# Patient Record
Sex: FEMALE | Race: ASIAN | NOT HISPANIC OR LATINO | Employment: FULL TIME | URBAN - METROPOLITAN AREA
[De-identification: names, ages, dates, MRNs, and addresses within clinical notes are randomized per-mention and may not be internally consistent; named-entity substitution may affect disease eponyms.]

---

## 2017-11-10 ENCOUNTER — OFFICE VISIT - HEALTHEAST (OUTPATIENT)
Dept: FAMILY MEDICINE | Facility: CLINIC | Age: 32
End: 2017-11-10

## 2017-11-10 DIAGNOSIS — E66.9 OBESITY: ICD-10-CM

## 2017-11-10 DIAGNOSIS — N92.6 IRREGULAR PERIODS/MENSTRUAL CYCLES: ICD-10-CM

## 2017-11-10 DIAGNOSIS — Z00.00 ROUTINE GENERAL MEDICAL EXAMINATION AT A HEALTH CARE FACILITY: ICD-10-CM

## 2017-11-10 DIAGNOSIS — L83 ACANTHOSIS NIGRICANS: ICD-10-CM

## 2017-11-10 DIAGNOSIS — E66.01 MORBID OBESITY (H): ICD-10-CM

## 2017-11-10 LAB
CHOLEST SERPL-MCNC: 177 MG/DL
FASTING STATUS PATIENT QL REPORTED: YES
HBA1C MFR BLD: 5.5 % (ref 3.5–6)
HDLC SERPL-MCNC: 50 MG/DL
LDLC SERPL CALC-MCNC: 111 MG/DL
TRIGL SERPL-MCNC: 82 MG/DL

## 2017-11-10 ASSESSMENT — MIFFLIN-ST. JEOR: SCORE: 1758.23

## 2017-11-14 LAB — 17-HYDROXYPROGESTERONE, S: <40 NG/DL

## 2017-11-16 ENCOUNTER — COMMUNICATION - HEALTHEAST (OUTPATIENT)
Dept: FAMILY MEDICINE | Facility: CLINIC | Age: 32
End: 2017-11-16

## 2017-11-16 LAB
BKR LAB AP ABNORMAL BLEEDING: NO
BKR LAB AP BIRTH CONTROL/HORMONES: NORMAL
BKR LAB AP CERVICAL APPEARANCE: NORMAL
BKR LAB AP GYN ADEQUACY: NORMAL
BKR LAB AP GYN INTERPRETATION: NORMAL
BKR LAB AP HPV REFLEX: NORMAL
BKR LAB AP LMP: NORMAL
BKR LAB AP PATIENT STATUS: NORMAL
BKR LAB AP PREVIOUS ABNORMAL: NO
BKR LAB AP PREVIOUS NORMAL: NORMAL
HIGH RISK?: NO
HPV INTERPRETATION - HISTORICAL: NORMAL
HPV INTERPRETER - HISTORICAL: NORMAL
PATH REPORT.COMMENTS IMP SPEC: NORMAL
RESULT FLAG (HE HISTORICAL CONVERSION): NORMAL

## 2020-06-24 ENCOUNTER — OFFICE VISIT - HEALTHEAST (OUTPATIENT)
Dept: FAMILY MEDICINE | Facility: CLINIC | Age: 35
End: 2020-06-24

## 2020-06-24 DIAGNOSIS — M54.6 CHRONIC BILATERAL THORACIC BACK PAIN: ICD-10-CM

## 2020-06-24 DIAGNOSIS — G89.29 CHRONIC BILATERAL THORACIC BACK PAIN: ICD-10-CM

## 2020-06-24 ASSESSMENT — MIFFLIN-ST. JEOR: SCORE: 1716.27

## 2020-07-10 ENCOUNTER — OFFICE VISIT - HEALTHEAST (OUTPATIENT)
Dept: PHYSICAL THERAPY | Facility: REHABILITATION | Age: 35
End: 2020-07-10

## 2020-07-10 DIAGNOSIS — G89.29 CHRONIC BILATERAL THORACIC BACK PAIN: ICD-10-CM

## 2020-07-10 DIAGNOSIS — M54.6 CHRONIC BILATERAL THORACIC BACK PAIN: ICD-10-CM

## 2020-07-24 ENCOUNTER — OFFICE VISIT - HEALTHEAST (OUTPATIENT)
Dept: PHYSICAL THERAPY | Facility: REHABILITATION | Age: 35
End: 2020-07-24

## 2020-07-24 DIAGNOSIS — M54.6 CHRONIC BILATERAL THORACIC BACK PAIN: ICD-10-CM

## 2020-07-24 DIAGNOSIS — G89.29 CHRONIC BILATERAL THORACIC BACK PAIN: ICD-10-CM

## 2021-01-26 ENCOUNTER — AMBULATORY - HEALTHEAST (OUTPATIENT)
Dept: NURSING | Facility: CLINIC | Age: 36
End: 2021-01-26

## 2021-02-16 ENCOUNTER — AMBULATORY - HEALTHEAST (OUTPATIENT)
Dept: NURSING | Facility: CLINIC | Age: 36
End: 2021-02-16

## 2021-03-04 ENCOUNTER — COMMUNICATION - HEALTHEAST (OUTPATIENT)
Dept: FAMILY MEDICINE | Facility: CLINIC | Age: 36
End: 2021-03-04

## 2021-03-05 ENCOUNTER — OFFICE VISIT - HEALTHEAST (OUTPATIENT)
Dept: FAMILY MEDICINE | Facility: CLINIC | Age: 36
End: 2021-03-05

## 2021-03-05 DIAGNOSIS — Z00.00 ROUTINE GENERAL MEDICAL EXAMINATION AT A HEALTH CARE FACILITY: ICD-10-CM

## 2021-03-05 DIAGNOSIS — N92.6 IRREGULAR PERIODS/MENSTRUAL CYCLES: ICD-10-CM

## 2021-03-05 DIAGNOSIS — Z13.220 SCREENING FOR LIPID DISORDERS: ICD-10-CM

## 2021-03-05 DIAGNOSIS — Z13.29 SCREENING FOR THYROID DISORDER: ICD-10-CM

## 2021-03-05 DIAGNOSIS — Z13.1 ENCOUNTER FOR SCREENING EXAMINATION FOR IMPAIRED GLUCOSE REGULATION AND DIABETES MELLITUS: ICD-10-CM

## 2021-03-05 LAB
CHOLEST SERPL-MCNC: 157 MG/DL
FASTING STATUS PATIENT QL REPORTED: YES
FASTING STATUS PATIENT QL REPORTED: YES
GLUCOSE BLD-MCNC: 92 MG/DL (ref 70–99)
HDLC SERPL-MCNC: 48 MG/DL
LDLC SERPL CALC-MCNC: 99 MG/DL
TRIGL SERPL-MCNC: 48 MG/DL
TSH SERPL DL<=0.005 MIU/L-ACNC: 1.34 UIU/ML (ref 0.3–5)

## 2021-03-05 RX ORDER — DROSPIRENONE AND ETHINYL ESTRADIOL 0.03MG-3MG
1 KIT ORAL DAILY
Qty: 3 PACKAGE | Refills: 11 | Status: SHIPPED | OUTPATIENT
Start: 2021-03-05 | End: 2024-01-01

## 2021-05-29 ENCOUNTER — RECORDS - HEALTHEAST (OUTPATIENT)
Dept: ADMINISTRATIVE | Facility: CLINIC | Age: 36
End: 2021-05-29

## 2021-05-30 ENCOUNTER — RECORDS - HEALTHEAST (OUTPATIENT)
Dept: ADMINISTRATIVE | Facility: CLINIC | Age: 36
End: 2021-05-30

## 2021-05-31 VITALS — WEIGHT: 239 LBS | BODY MASS INDEX: 42.35 KG/M2 | HEIGHT: 63 IN

## 2021-06-04 VITALS
WEIGHT: 229.75 LBS | DIASTOLIC BLOOD PRESSURE: 78 MMHG | RESPIRATION RATE: 20 BRPM | SYSTOLIC BLOOD PRESSURE: 106 MMHG | HEIGHT: 63 IN | BODY MASS INDEX: 40.71 KG/M2 | HEART RATE: 62 BPM | TEMPERATURE: 98.7 F | OXYGEN SATURATION: 99 %

## 2021-06-05 VITALS
RESPIRATION RATE: 18 BRPM | SYSTOLIC BLOOD PRESSURE: 110 MMHG | BODY MASS INDEX: 38.93 KG/M2 | HEART RATE: 62 BPM | DIASTOLIC BLOOD PRESSURE: 72 MMHG | TEMPERATURE: 98.1 F | OXYGEN SATURATION: 99 % | WEIGHT: 219.75 LBS

## 2021-06-09 NOTE — PROGRESS NOTES
[facility]   Cervical Thoracic Initial Evaluation    Patient Name: Siria Ward  Date of evaluation: 7/10/2020  Referral Diagnosis: Chronic bilateral thoracic back pain  Referring provider: Teena Franklin MD  Visit Diagnosis:     ICD-10-CM    1. Chronic bilateral thoracic back pain  M54.6     G89.29        Assessment:    T9 is the only one right rotated after teaching the exercises supine towel pectoralis muscle stretch with a towel roll was effective to get most of the rotations out of the spine as well as the other exercises.  Intercostal nerve mobilizations between ribs 7 and 8 and 8 and 9. The the patient's spine was neutral.  The patient has pain when trying to sit upright after only 3-5 minutes when not supported.  Patient trialed a chair that you sit backwards in and it holds the weight of your back when leaning forward on the chair.  Pt. is appropriate for skilled PT intervention as outlined in the Plan of Care (POC).  Pt. is a good candidate for skilled PT services to improve pain levels and function.  Skilled Physical Therapy needed to increase ROM, increase strength, decrease pain and improve functional status.      Goals:  Pt. will demonstrate/verbalize independence in self-management of condition in : 12 weeks;Comment  Comment:: to aid in managing her symptoms at home  Pt. will be independent with home exercise program in : 12 weeks;Comment  Comment:: to aid in managing her symptoms at home  Pt. will report decreased intensity, frequency of : Pain;in 6 weeks;in 12 weeks;Comment  Comment:: patient will report pain in her back only 1-2 times per week and at a 1-2/10 pain or less  Pt. will improve posture : and demonstrate posture with minimal to no cuing;and maintain posture for;30 minutes;in sitting;for working;in 12 weeks  Patient will sit: 30 minutes;for work;for eating;for watching TV;for driving;with less pain;with less difficultty;in 6 weeks;in 12  weeks;Comment  Comment: pain to be intermittent and with correction the pain is to be only a 2/10 pain or less after 30 minutes of sitting with proper posture.    Goals were set in collaboration with the patient.    Patient's expectations/goals are realistic.    Barriers to Learning or Achieving Goals:  No Barriers.       Plan / Patient Instructions:        Plan of Care:   Authorization / Certification Number of Visits: 12  Communication with: Referral Source  Patient Related Instruction: Nature of Condition;Treatment plan and rationale;Self Care instruction;Basis of treatment;Body mechanics;Posture;Precautions;Next steps;Expected outcome  Times per Week: 1  Number of Weeks: 12  Number of Visits: 12  Discharge Planning: when goals are met or plateau of progress  Precautions / Restrictions : none  Therapeutic Exercise: ROM;Stretching;Strengthening  Neuromuscular Reeducation: kinesio tape;posture;balance/proprioception;TNE;core  Manual Therapy: soft tissue mobilization;myofascial release;joint mobilization;muscle energy;other  Manual Therapy: neural mobilization  Modalities: traction;electrical stimulation;TENS;ultrasound;cold pack;hot pack;other  Modalities: check precautions first  Functional Training (ADL's): ergonomics  Equipment: theraband      Plan for next visit: Go over posture and body mechanics.  Progress stabilization.  Reasses spine position.  Check slouching then doing leg and also slouching with leg and then flexing neck see if she has difficulty with her dura pulling up.  Also check mobility of nerves.     Subjective:           History of Present Illness:    Siria is a 35 y.o. female who presents to therapy today with complaints of upper back pain have had pain off and on for years about 5 years now getting it 2-3 times per week.  Previously once every week   . Date of onset/duration of symptoms is worse gradual. Worse since June.  The patient reports that she had decreased pain with weights  especially the one with her arms over her head stretching when she was on her back and the bringing it forward.  Since she started back at work she has noticed now she also has pain in the left shoulder blade more than the right shoulder blade area as well as her upper back pain.  She reports that she has a postural strap for her shoulder posture support.  Then she felt like she had better posture afterward.  But then when she went back to work she felt more pain at work.  She has an exercise tape that she does that has swimmer exercises,Chest flys, and superman.  Went on a lot of walks now not any more as she has gone back to work       Pain Rating:3  Pain rating at best: 0  Pain rating at worst: 10  Pain description: burning, sharp and huge load like someone is sitting on her.    Functional limitations are described as occurring with:   sitting 1 hour then increased pain.   Have to sleep laying flat to give her back a relief.  Looking down increasing her pain.    Have to tilt her chin up to feel better.        Patient reports exercises seem to help temporalily CBD oil icy hot or biofreeze seems to help a little taking off bra helps.         Objective:      Note: Items left blank indicates the item was not performed or not indicated at the time of the evaluation.    Patient Outcome Measures :    Modified Oswestry Low Back Pain Disablity Questionnaire  in %: 0.32     Scores range from 0-100%, where a score of 0% represents minimal pain and maximal function. The minimal clinically important difference is a score reduction of 12%.    Cervical Thoracic Examination  1. Chronic bilateral thoracic back pain       Involved side: Bilateral and left worse than right.  Posture Observation:      General sitting posture is  fair.  General standing posture is fair.  Right rotation L2 , T9 T1,2,3    Left side of rib cage rib 7 and 8, did manual nerve mobilizations to intercostal nerve between 7 and 8  Cervical ROM:    Date:       *Indicate scale AROM AROM AROM   Cervical Flexion 3 fingers to chest no change with short periods of time.     Cervical Extension 21      Right Left Right Left Right Left   Cervical Sidebending 2 fingers to the shoulder 4 fingers to the shoulder       Cervical Rotation 50 44       Cervical Protraction      Cervical Retraction      Thoracic Flexion      Thoracic Extension      Thoracic Sidebending         Thoracic Rotation         Checked in flexion and spine was neutral except C2 was right rotated very slightly.    Shoulder ROM WFL no pain slight strain when doing singly but when doing it together then slight increase in pain.    Strength     Date:      Cervical Myotomes/5 Right Left Right Left Right Left   Cervical Flexion (C1-2)         Cervical Sidebending (C3)         Shoulder Elevation (C4)         Shoulder Abduction (C5) 5/5 5/5       Elbow Flexion (C6) 5/5 5/5       Elbow Extension (C7) 5/5 5/5       Wrist Flexion (C7) 5/5 5/5       Wrist Extension (C6) 5/5 5/5       Thumb abduction (C8) 5/5 5/5       Finger Abduction (T1) 5/5 5/5         Sensation   Patient reports that this is normal      Reflex Testing  Cervical Dermatomes Right Left UE Reflexes Right Left   Back of the Head (C2)   Biceps (C5-6)     Supraclavicular Fossa (C3)   Brachioradialis (C5-6)     AC Joint (C4)   Triceps (C7-8)     Lateral Biceps (C5)   Suni s test     Palmar Thumb (C6)   LE Reflexes     Palmar 3rd Finger (C7)   Patellar (L3-4)     Palmar 5th Finger (C8)   Achilles (S1-2)     Ulnar Forearm (T1)   Babinski Response             Cervical Special Tests       Cervical Special Tests Right Left UE Nerve Mobility Right Left   Cervical compression   Median nerve     Cervical distraction   Ulnar nerve     Spurling s test   Radial nerve     Shoulder abduction sign   Thoracic outlet     Deep neck flexor endurance test   Shaila     Upper cervical rotation   Adson s     Sharper-Diann   Cervical rotation lateral flexion     Alar ligament test    Other:     Other:   Other:       UE Screen: Shoulder ROM WFL no pain slight strain when doing singly but when doing it together then slight increase in pain.    Treatment Today     TREATMENT MINUTES COMMENTS   Evaluation 30    Self-care/ Home management     Manual therapy 12  Intercostal nerve mobilizations between ribs 7 and 8 and 8 and 9. T   Neuromuscular Re-education     Therapeutic Activity     Therapeutic Exercises 13 See exercises   Gait training     Modality__________________                Total 55    Blank areas are intentional and mean the treatment did not include these items.     PT Evaluation Code: (Please list factors)  Patient History/Comorbidities: none  Examination: thoracic back pain  Clinical Presentation: stable  Clinical Decision Making: low    Patient History/  Comorbidities Examination  (body structures and functions, activity limitations, and/or participation restrictions) Clinical Presentation Clinical Decision Making (Complexity)   No documented Comorbidities or personal factors 1-2 Elements Stable and/or uncomplicated Low   1-2 documented comorbidities or personal factor 3 Elements Evolving clinical presentation with changing characteristics Moderate   3-4 documented comorbidities or personal factors 4 or more Unstable and unpredictable High              Tari Stevens PT  7/10/2020  8:13 AM

## 2021-06-09 NOTE — PROGRESS NOTES
"S:  Siria Ward is a 34 y.o. female who comes to the clinic today for  1.  Upper back pain:  Worse by the end of the day.  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  It is now 3 days in a row where it is worse than it has ever been.    She lost 10 lbs over the quarantine intentionally, and was doing a lot of upper body work and training . She used weights for this.  She tried massage for the pain at that time.  She did a combination of both, but did mostly upper chest, arms, and upper back.    No n/t in her hands.  No weakness in her hands.  No headaches.  She had migraines this week due to her menses .   No change in her back pain with meals. The pain shoots into her shoulder blades.  The pain is worse on her left side than the right.    Her left breast is larger than the right.  She feels a clicking on the left when she moves her arm.    Her pain usually improves at night.  She usually sleeps completely flat to straighten her back out, and this helps.    She is doing 32 hours 4 days a week.      I reviewed the pertinent family, social, surgical, medical history.      O:  /78 (Patient Site: Right Arm, Patient Position: Sitting, Cuff Size: Adult Regular)   Pulse 62   Temp 98.7  F (37.1  C) (Oral)   Resp 20   Ht 5' 3\" (1.6 m)   Wt (!) 229 lb 12 oz (104.2 kg)   LMP 06/19/2020 (Approximate)   SpO2 99%   BMI 40.70 kg/m    Gen:  Nad, alert  Full rom in bilateral shoulders.  Full rom in neck.    She has difficulty mobilizing the trapezius, rhomboid, subscapular musculature bilaterally.  Palpation of this as well as her bilateral trapezius reproduce all of her pain.  Sensation is intact over her bilateral upper extremities.  Deep tendon reflexes are symmetric.  Strength is 5 out of 5 in her upper extremities.  With stretches and exercises as well as some trigger point therapy following this she was able to mobilize the musculature of the back and actually said that the whole thing felt somewhat " looser.  She was noted to be holding herself with her shoulders in a tense position and needed some coaching to help relax these down.  She does have larger breasts and was noted to have large grooves in bilateral shoulders due to her bra straps.    Patient Active Problem List   Diagnosis     Obesity     Normal delivery     Current Outpatient Medications on File Prior to Visit   Medication Sig Dispense Refill     drospirenone-ethinyl estradiol (EPHRAIM, 28,) 3-0.03 mg per tablet Take 1 tablet by mouth daily. 3 Package 11     metFORMIN (GLUCOPHAGE) 500 MG tablet Take 1 tablet (500 mg total) by mouth 2 (two) times a day with meals. 180 tablet 3     No current facility-administered medications on file prior to visit.           No results found for this or any previous visit (from the past 48 hour(s)).     No images are attached to the encounter or orders placed in the encounter.       Assessment/Plan:  1. Chronic bilateral thoracic back pain  Refer to PT/OT.  Gentle stretches and exercises given today.  If she is interested in pursuing a surgical consult for bilateral breast reduction I did let her know that I would be happy to provide that referral.  I did advise her to stop using weights for now until she can get into physical therapy.    - Ambulatory referral to PT/OT          Teena Franklin   6/24/2020 1:54 PM

## 2021-06-09 NOTE — PROGRESS NOTES
[facility] Daily Progress     Patient Name: Siria Ward  Date: 7/24/2020  Visit #: 2  PTA visit #:  0  Referral Diagnosis:   Referring provider: Teena Franklin MD  Visit Diagnosis:     ICD-10-CM    1. Chronic bilateral thoracic back pain  M54.6     G89.29         T9 is the only one right rotated after teaching the exercises supine towel pectoralis muscle stretch with a towel roll was effective to get most of the rotations out of the spine as well as the other exercises.  Intercostal nerve mobilizations between ribs 7 and 8 and 8 and 9. The the patient's spine was neutral.  The patient has pain when trying to sit upright after only 3-5 minutes when not supported.  Patient trialed a chair that you sit backwards in and it holds the weight of your back when leaning forward on the chair.  Assessment:   Plan for next visit: Go over posture and body mechanics.  Progress stabilization.  Reasses spine position.  Check slouching then doing leg and also slouching with leg and then flexing neck see if she has difficulty with her dura pulling up.  Also check mobility of nerves.  HEP/POC compliance is  good .  Patient demonstrates understanding/independence with home program.  Patient is benefitting from skilled physical therapy and is making steady progress toward functional goals.    Goal Status:  Pt. will demonstrate/verbalize independence in self-management of condition in : 12 weeks;Comment  Comment:: to aid in managing her symptoms at home  Pt. will be independent with home exercise program in : 12 weeks;Comment  Comment:: to aid in managing her symptoms at home  Pt. will report decreased intensity, frequency of : Pain;in 6 weeks;in 12 weeks;Comment  Comment:: patient will report pain in her back only 1-2 times per week and at a 1-2/10 pain or less  Pt. will improve posture : and demonstrate posture with minimal to no cuing;and maintain posture for;30 minutes;in sitting;for working;in 12 weeks  Patient  will sit: 30 minutes;for work;for eating;for watching TV;for driving;with less pain;with less difficultty;in 6 weeks;in 12 weeks;Comment  Comment: pain to be intermittent and with correction the pain is to be only a 2/10 pain or less after 30 minutes of sitting with proper posture.        Plan / Patient Education:     Continue with initial plan of care.  Progress with home program as tolerated.    Subjective:   5-6/10 pain when she came in.  Slight in crease with the exercises but when she gets home she feels better.    6-7/10 pain when she gets it 2 days this past week.  Not so much in the shoulder blade now more just up in the upper trap bilateraly no pain in the neck.      Objective:   C3,4,5 right and T3  T3 listening    FRS right T3 MET to correct FRS right T3    T5 right rotated both flexion and extension gentle manual mobilization both slouched and erect posture      Treatment Today     TREATMENT MINUTES COMMENTS   Evaluation     Self-care/ Home management     Manual therapy 20 FRS right T3 MET to correct FRS right T3    T5 right rotated both flexion and extension gentle manual mobilization both slouched and erect posture   Neuromuscular Re-education     Therapeutic Activity     Therapeutic Exercises 10    Gait training     Modality__________________                Total 30    Blank areas are intentional and mean the treatment did not include these items.       Tari Stevens PT  7/24/2020

## 2021-06-14 NOTE — PROGRESS NOTES
Assessment:      Healthy female exam.    1. Routine general medical examination at a health care facility  The following high BMI interventions were performed this visit: encouragement to exercise and prescribed dietary intake  Encouraged healthy diet and exercise.  Encouraged to remove all pop and juice, as well as most simple carbohydrates.  Increase fresh fruits and vegetables.    Try to exercise at least 10 minutes most jkaub  - Gynecologic Cytology (PAP Smear)    2. Obesity    - 17-Hydroxyprogesterone; Future  - Comprehensive metabolic panel; Future  - Pregnancy, urine; Future  - Prolactin; Future  - Testosterone, free, total; Future  - TSH; Future  - Follicle stimulating hormone; Future  - DHEA-sulfate; Future  - Androstenedione; Future  - Hemoglobin A1c; Future  - Lipid panel; Future  - T4, free; Future  - Luteinizing hormone; Future  - drospirenone-ethinyl estradiol (EPHRAIM, 28,) 3-0.03 mg per tablet; Take 1 tablet by mouth daily.  Dispense: 3 Package; Refill: 11  - metFORMIN (GLUCOPHAGE) 500 MG tablet; Take 1 tablet (500 mg total) by mouth 2 (two) times a day with meals.  Dispense: 180 tablet; Refill: 3  - 17-Hydroxyprogesterone  - Comprehensive metabolic panel  - Prolactin  - Testosterone, free, total  - TSH  - Follicle stimulating hormone  - DHEA-sulfate  - Androstenedione  - Hemoglobin A1c  - Lipid panel  - T4, free  - Luteinizing hormone    3. Irregular periods/menstrual cycles  Clinical picture is worrisome for PCOS, which was reviewed with the patient today.    Encouraged to start birth control.  We reviewed the risks and benefits of oral contraceptives including the risk of blood clots.  If she develops any signs of this she is to follow-up immediately.  I did review that there is possibly an increased risk of blood clots in this patient with a sister who had a history of blood clots.  I did ask her to investigate whether or not her sister had any sort of workup for hypercoagulability.  We discussed  the importance of not being sedentary when on birth control.  Asked her to start her birth control and take this for 1 month and then from there start metformin.  I reviewed the importance of losing some weight prior to trying for pregnancy.  We did review that if she gets pregnant at this time she will be a very high risk for complications including diabetes, hypertension.  - 17-Hydroxyprogesterone; Future  - Comprehensive metabolic panel; Future  - Pregnancy, urine; Future  - Prolactin; Future  - Testosterone, free, total; Future  - TSH; Future  - Follicle stimulating hormone; Future  - DHEA-sulfate; Future  - Androstenedione; Future  - Hemoglobin A1c; Future  - Lipid panel; Future  - T4, free; Future  - Luteinizing hormone; Future  - drospirenone-ethinyl estradiol (EPHRAIM, 28,) 3-0.03 mg per tablet; Take 1 tablet by mouth daily.  Dispense: 3 Package; Refill: 11  - metFORMIN (GLUCOPHAGE) 500 MG tablet; Take 1 tablet (500 mg total) by mouth 2 (two) times a day with meals.  Dispense: 180 tablet; Refill: 3  - 17-Hydroxyprogesterone  - Comprehensive metabolic panel  - Prolactin  - Testosterone, free, total  - TSH  - Follicle stimulating hormone  - DHEA-sulfate  - Androstenedione  - Hemoglobin A1c  - Lipid panel  - T4, free  - Luteinizing hormone    4. Morbid obesity  See above    5. Acanthosis nigricans    Subjective:      Siria Ward is a 32 y.o. female who presents for an annual exam. The patient is sexually active. The patient participates in regular exercise: no. The patient reports that there is not domestic violence in her life.   She is not having regular periods.  In the past year she has had frequent periods, and they are quite light.  It is never a full period.  In the past year her weight has also gone up a lot.    She had a lot of stress and was really depressed, she took a week off work and in that week she lost 5 lbs.  She does not have a lot of time to eat at work, so when she does eat, she binges a  lot and eats whatever is there.  She has some headaches.  No vision changes.  No bowel or bladder problems.  She says she is haivng some migraines.    She is not sleeping well at night, and then is quite tired in the am.  She goes to bed around midnight.  She gets up in the am at 6am.  She is using a lot of caffeine.  She did try some herbalife liftoff instead of drinking coffee.    She drinks more pop and juice than water.    She is not wanting to be pregnant right now, but it would not be the worst thing.    She is not using any birth control.    She used 3 cycles of clomid to get pregnant with her daughter.      Sister with blood clots.  She is obese, and had 2 miscarriages.  No known clotting problems in the family.      Healthy Habits:   Regular Exercise: No  Sunscreen Use: No  Healthy Diet: No  Dental Visits Regularly: Yes  Seat Belt: Yes  Sexually active: Yes  Self Breast Exam Monthly:No  Hemoccults: N/A  Flex Sig: N/A  Colonoscopy: N/A  Lipid Profile: No  Glucose Screen: No  Prevention of Osteoporosis: N/A  Last Dexa: N/A  Guns at Home:  Yes  Guns Safety Locks:  No      Immunization History   Administered Date(s) Administered     Hep B, historic 2002     Influenza, Live, Nasal LAIV3 10/24/2012     Influenza, inj, historic,unspecified 2013, 2015     Influenza,live, Nasal Laiv4 2014     Influenza,seasonal quad, PF, 36+MOS 10/01/2015     Influenza,seasonal, Inj IIV3 2011     Td,adult,historic,unspecified 08/10/1999, 2000     Tdap 2014     Immunization status: up to date and documented, Refuses Immunization.    No exam data present    Gynecologic History  Patient's last menstrual period was 10/31/2017 (approximate).  Contraception: none  [facility] Results were: normal  Last mammogram: n/a. Results were:       OB History    Para Term  AB Living   2 2 1   1   SAB TAB Ectopic Multiple Live Births       1      # Outcome Date GA Lbr  "Mac/2nd Weight Sex Delivery Anes PTL Lv   2 Term 08/03/14 40w0d 03:50 / 00:06 7 lb 6.3 oz (3.355 kg) F Vag-Spont None N JENNY   1 Para                   No current outpatient prescriptions on file.     No current facility-administered medications for this visit.      Past Medical History:   Diagnosis Date     Migraine      Vertigo      No past surgical history on file.  Review of patient's allergies indicates no known allergies.  No family history on file.  Social History     Social History     Marital status:      Spouse name: N/A     Number of children: N/A     Years of education: N/A     Occupational History     Not on file.     Social History Main Topics     Smoking status: Never Smoker     Smokeless tobacco: Never Used     Alcohol use No     Drug use: No     Sexual activity: Not on file     Other Topics Concern     Not on file     Social History Narrative       Review of Systems  General:  Denies problem  Eyes: Denies problem  Ears/Nose/Throat: Denies problem  Cardiovascular: Denies problem  Respiratory:  Denies problem  Gastrointestinal:  Denies problem, Genitourinary: Denies problem  Musculoskeletal:  Denies problem  Skin: Denies problem  Neurologic: Denies problem  Psychiatric: Denies problem  Endocrine: Denies problem  Heme/Lymphatic: Denies problem   Allergic/Immunologic: Denies problem        Objective:         Vitals:    11/10/17 0817   BP: 122/80   Pulse: 76   Resp: 20   Temp: 97.9  F (36.6  C)   TempSrc: Oral   Weight: (!) 239 lb (108.4 kg)   Height: 5' 3\" (1.6 m)     Body mass index is 42.34 kg/(m^2).    Physical Exam:  General Appearance: Alert, cooperative, no distress, appears stated age, overweight.    Head: Normocephalic, without obvious abnormality, atraumatic  Eyes: PERRL, conjunctiva/corneas clear, EOM's intact  Ears: Normal TM's and external ear canals, both ears  Nose: Nares normal, septum midline,mucosa normal, no drainage  Throat: Lips, mucosa, and tongue normal; teeth and gums " normal  Neck: Supple, symmetrical, trachea midline, no adenopathy;  thyroid: not enlarged, symmetric, no tenderness/mass/nodules; no carotid bruit or JVD  Back: Symmetric, no curvature, ROM normal, no CVA tenderness  Lungs: Clear to auscultation bilaterally, respirations unlabored  Breasts: No breast masses, tenderness, asymmetry, or nipple discharge.  Heart: Regular rate and rhythm, S1 and S2 normal, no murmur, rub, or gallop, Abdomen: Soft, non-tender, bowel sounds active all four quadrants,  no masses, no organomegaly  Pelvic:Normally developed genitalia with no external lesions or eruptions. Vagina and cervix show no lesions, inflammation, discharge or tenderness. No cystocele, No rectocele. Uterus midline.  No adnexal mass or tenderness.      Extremities: Extremities normal, atraumatic, no cyanosis or edema  Skin: acanthosis nigricans over the back of her neck.    Lymph nodes: Cervical, supraclavicular, and axillary nodes normal  Neurologic: Normal

## 2021-06-15 NOTE — PROGRESS NOTES
FEMALE PREVENTATIVE EXAM    Assessment and Plan:   1. Routine general medical examination at a health care facility  Work on strengthening adductors on the right leg . Exercises given today.  If no improvement, then I can refer her to PT.    Continue to work on functional goals for body mechanics and activity .   Continue to modify diet to improve health.  Eat a small, carbohydrate rich breakfast.      - Lipid Cascade  - Glucose    2. Irregular periods/menstrual cycles  Start ocp.  Reviewed risks of irregular menses if endometrial lining is building up.    - drospirenone-ethinyl estradioL (EPHRAIM, 28,) 3-0.03 mg per tablet; Take 1 tablet by mouth daily.  Dispense: 3 Package; Refill: 11  - Thyroid Greenlee    3. Screening for lipid disorders    - Lipid Cascade    4. Encounter for screening examination for impaired glucose regulation and diabetes mellitus    - Glucose    5. Screening for thyroid disorder    - Thyroid Greenlee        Next follow up:  No follow-ups on file.    Immunization Review  Adult Imm Review: No immunizations due today      I discussed the following with the patient:   Adult Healthy Living: Importance of regular exercise  Healthy nutrition  Weight loss referral options  Getting adequate sleep  Stress management        Subjective:   Chief Complaint: Siria Ward is an 35 y.o. female here for a preventative health visit.  {Patient has been advised of split billing requirements and indicates understanding: Yes  HPI:    She is working at [employer].  She is well protected.  She got her covid vaccines.  Her allergies have been really bad.  She did have a covid test that was negative.  She takes allegra regularly.  She doesn't feel like claritin works or zyrtec.    She does meal preps for lunch.  She is trying to lose weight.  Her back was hurting and she was losing weight for this.  She first tried a keto diet, but didn't feel well with this.  She has since gone to just working on more  vegetables and doing some fasting from 7pm to noon the next day .   She is lactose intolerant, so she doesn't eat dairy.  She eats some lean meats.  She eats vegetables.    She has cut out a lot of sugar.  She cut out soda.    She is not taking her metformin.  She is not using any contraceptives.  She has not been able to get pregnant for 6 years.    She only took metformin for a few months.  She got diarrhea from it .   She is ok with not getting pregnant .     She does have some knee pain on the right with running.  It is a clicking across her knee, like something isn't in the right spot . It loosens up after a little while.  It feels like her whole right leg isn't quite as strong as her left.      Healthy Habits  Are you taking a daily aspirin? No  Do you typically exercising at least 40 min, 3-4 times per week?  Yes  Do you usually eat at least 4 servings of fruit and vegetables a day, include whole grains and fiber and avoid regularly eating high fat foods? NO  Have you had an eye exam in the past two years? yes  Do you see a dentist twice per year? Yes  Do you have any concerns regarding sleep? No    Safety Screen  If you own firearms, are they secured in a locked gun cabinet or with trigger locks? Yes  Do you feel you are safe where you are living?: Yes (3/5/2021  9:08 AM)  Do you feel you are safe in your relationship(s)?: Yes (3/5/2021  9:08 AM)      Review of Systems:  Please see above.  The rest of the review of systems are negative for all systems.       Cancer Screening       Status Date      PAP SMEAR Next Due 11/10/2022      Done 11/10/2017 GYNECOLOGIC CYTOLOGY (PAP SMEAR)     Patient has more history with this topic...              History     Not marked as reviewed during this visit.            Objective:   Vital Signs: There were no vitals taken for this visit.       PHYSICAL EXAM  General Appearance: Alert, cooperative, no distress, appears stated age  Head: Normocephalic, without obvious  abnormality, atraumatic  Eyes: PERRL, conjunctiva/corneas clear, EOM's intact  Ears: Normal TM's and external ear canals, both ears  Nose: Nares normal, septum midline,mucosa normal, no drainage  Throat: Lips, mucosa, and tongue normal; teeth and gums normal  Neck: Supple, symmetrical, trachea midline, no adenopathy;  thyroid: not enlarged, symmetric, no tenderness/mass/nodules; no carotid bruit or JVD  Back: Symmetric, no curvature, ROM normal, no CVA tenderness  Lungs: Clear to auscultation bilaterally, respirations unlabored  Breasts: No breast masses, tenderness, asymmetry, or nipple discharge.  Heart: Regular rate and rhythm, S1 and S2 normal, no murmur, rub, or gallop,   Abdomen: Soft, non-tender, bowel sounds active all four quadrants,  no masses, no organomegaly.  Extremities: Extremities normal, atraumatic, no cyanosis or edema.  Decreased strength in adduction of right leg.  No joint line tenderness in right knee.  No clicking or locking noted.  Patella tracks somewhat laterally.  No effusion noted.    Skin: Skin color, texture, turgor normal, no rashes or lesions.   noted.   Lymph nodes: Cervical, supraclavicular, and axillary nodes normal  Neurologic: Normal         Additional Screenings Completed Today:

## 2021-06-18 NOTE — PATIENT INSTRUCTIONS - HE
Patient Instructions by Tari Stevens PT at 7/24/2020  7:00 AM     Author: Tari Stevens PT Service: -- Author Type: Physical Therapist    Filed: 7/24/2020  7:32 AM Encounter Date: 7/24/2020 Status: Signed    : Tari Stevens PT (Physical Therapist)        CHIN TUCK - SUPINE    While lying on your back, tuck your chin towards your chest and press the back of your head into the table.    Maintain contact of head with the surface you are lying on the entire time.    RETRACTION / CHIN TUCK    Slowly draw your head back so that your ears line up with your shoulders.       The [place] head and neck opposite the knees

## 2021-06-18 NOTE — PATIENT INSTRUCTIONS - HE
Patient Instructions by Tari Stevens PT at 7/10/2020  8:00 AM     Author: Tari Stevens PT Service: -- Author Type: Physical Therapist    Filed: 7/10/2020  9:05 AM Encounter Date: 7/10/2020 Status: Addendum    : Tari Stevens PT (Physical Therapist)    Related Notes: Original Note by Tari Stevens PT (Physical Therapist) filed at 7/10/2020  8:57 AM        SCAPULAR RETRACTIONS    Draw your shoulder blades back and down.  Off and on throughout the day when every you catch yourself slouching    ELASTIC BAND ROWS     Holding elastic band with both hands, draw back the band as you bend your elbows. Keep your elbows near the side of your body.  Not yet    ELASTIC BAND EXTENSION BILATERAL SHOULDER    While holding an elastic band with both arms in front of you with your elbows straight, pull the band downwards and back towards your side.  Not yet     Patient lays supine with a foam roll or stack of pillows aligned under spine or towel roll  -Patient slowly brings her arms into 90 deg horizontal abduction and allows arms to lower to floor (if possible).  -Patient holds position for 30 sec before bringing arms above head while still in horizontal abduction.  -Patient holds for another 30 sec before returning to 90 degrees.  -2 sets       Reps  1     Hold 3 minutes          Complete 1-3 Set(s)     Perform  Time(s)           DOORWAY STRETCH - HIGH    While standing in a doorway, place your arms up on the door frame and lean in until a stretch is felt along the front of your chest and/or shoulders. Your upper arms should be placed upward along the door frame.     NOTE: Your legs should control how much you stretch by bending or straightening your knee through the doorway.    DOORWAY STRETCH - LOW    While standing in a doorway, place your arm downward on the door frame and lean in until a stretch is felt along the front of your chest and/or shoulder. Your arm should be pointed  downward towards the floor along the door frame.    NOTE: Your legs should control how much you stretch by bending or straightening your knee through the doorway.    Pec corner stretch    Find a comfortable position for your hands and lightly lean forward into the corner until you feel a good stretch in your pecs. Hold 20-30 sec, x2-3 reps         May use a ball to hold on the wall or on yur stomach 3 minutes on each side.

## 2021-06-26 NOTE — PROGRESS NOTES
[facility] Discharge Summary  Patient Name: Siria Ward  Date: 6/11/2021  Referral Diagnosis: Chronic bilateral thoracic back pain   Referring provider: Teena Franklin MD  Visit Diagnosis:   1. Chronic bilateral thoracic back pain         Goals:  Pt. will demonstrate/verbalize independence in self-management of condition in : 12 weeks;Comment  Comment:: to aid in managing her symptoms at home  Pt. will be independent with home exercise program in : 12 weeks;Comment  Comment:: to aid in managing her symptoms at home  Pt. will report decreased intensity, frequency of : Pain;in 6 weeks;in 12 weeks;Comment  Comment:: patient will report pain in her back only 1-2 times per week and at a 1-2/10 pain or less  Pt. will improve posture : and demonstrate posture with minimal to no cuing;and maintain posture for;30 minutes;in sitting;for working;in 12 weeks  Patient will sit: 30 minutes;for work;for eating;for watching TV;for driving;with less pain;with less difficultty;in 6 weeks;in 12 weeks;Comment  Comment: pain to be intermittent and with correction the pain is to be only a 2/10 pain or less after 30 minutes of sitting with proper posture.    Patient was seen for 2 visits from 7/10/20 to 7/24/20 with 2 missed appointments.    HEP consists of scapular retraction, cervical retraction, pectoralis muscle stretch 3 positions, ball and the wall to massage pectoralis muscle, and Swedish stretch  The patient discontinued therapy, did not return.    Therapy will be discontinued at this time.  The patient will need a new referral to resume.    Thank you for your referral.  Tari Stevens PT  6/11/2021  6:49 AM

## 2021-10-16 ENCOUNTER — HEALTH MAINTENANCE LETTER (OUTPATIENT)
Age: 36
End: 2021-10-16

## 2022-05-28 ENCOUNTER — HEALTH MAINTENANCE LETTER (OUTPATIENT)
Age: 37
End: 2022-05-28

## 2022-10-01 ENCOUNTER — HEALTH MAINTENANCE LETTER (OUTPATIENT)
Age: 37
End: 2022-10-01

## 2023-06-04 ENCOUNTER — HEALTH MAINTENANCE LETTER (OUTPATIENT)
Age: 38
End: 2023-06-04

## 2024-01-01 ENCOUNTER — OFFICE VISIT (OUTPATIENT)
Dept: FAMILY MEDICINE | Facility: CLINIC | Age: 39
End: 2024-01-01
Payer: COMMERCIAL

## 2024-01-01 ENCOUNTER — NURSE TRIAGE (OUTPATIENT)
Dept: NURSING | Facility: CLINIC | Age: 39
End: 2024-01-01
Payer: COMMERCIAL

## 2024-01-01 VITALS
TEMPERATURE: 98.9 F | RESPIRATION RATE: 18 BRPM | HEART RATE: 75 BPM | OXYGEN SATURATION: 96 % | DIASTOLIC BLOOD PRESSURE: 78 MMHG | BODY MASS INDEX: 42.94 KG/M2 | WEIGHT: 242.4 LBS | SYSTOLIC BLOOD PRESSURE: 116 MMHG

## 2024-01-01 DIAGNOSIS — J01.00 ACUTE NON-RECURRENT MAXILLARY SINUSITIS: Primary | ICD-10-CM

## 2024-01-01 DIAGNOSIS — R07.0 THROAT PAIN: ICD-10-CM

## 2024-01-01 LAB
DEPRECATED S PYO AG THROAT QL EIA: NEGATIVE
GROUP A STREP BY PCR: NOT DETECTED

## 2024-01-01 PROCEDURE — 99213 OFFICE O/P EST LOW 20 MIN: CPT | Performed by: PHYSICIAN ASSISTANT

## 2024-01-01 PROCEDURE — 87651 STREP A DNA AMP PROBE: CPT | Performed by: PHYSICIAN ASSISTANT

## 2024-01-01 RX ORDER — FLUTICASONE PROPIONATE 50 MCG
1 SPRAY, SUSPENSION (ML) NASAL DAILY
Qty: 9 ML | Refills: 0 | Status: SHIPPED | OUTPATIENT
Start: 2024-01-01 | End: 2024-06-29

## 2024-01-01 NOTE — PATIENT INSTRUCTIONS
Over-the-counter nasal steroid spray, follow packaging directions  Over-the-counter Mucinex, follow packaging directions  Hot packs 3 times per day to the forehead and face over the tender sinuses  Nasal saline irrigation or Sugar Arellano for congestion  Antibiotic as written below.  Risks and benefits of the medication were gone over.  Indication for return to see urgent care or family practice provider for reevaluation and treatment.

## 2024-01-01 NOTE — TELEPHONE ENCOUNTER
Nurse Triage SBAR    Is this a 2nd Level Triage? NO    Situation: Patient had URI symptoms   Consent: not needed    Background: COVID negative x2     Assessment:   think that she has the flu for a little over a week- green mucous  Coughed up a pink tinged sputum  Indicates white spots on her tonsils as well- with a sore throat- indicates mild   Sinus pressure and headache  - indicates chills and fever resolved- couple of days ago  Denies chest pain   Notes she has intermittent wheezing as well   No runny nose  Has to sleep propped up- will cause her to cough  Mild sore throat  Indicates headache    Protocol Recommended Disposition:       Recommendation: Advised to be seen in UC today - reviewed additional care advice with and she verbalizes understanding. Reviewed location and hours of [facility]. Patient declines additional questions.     UC    Does the patient meet one of the following criteria for ADS visit consideration? No      Ashley Natarajan RN 9:13 AM 1/1/2024  Reason for Disposition   [1] MILD difficulty breathing (e.g., minimal/no SOB at rest, SOB with walking, pulse <100) AND [2] still present when not coughing    Additional Information   Negative: SEVERE difficulty breathing (e.g., struggling for each breath, speaks in single words)   Negative: Bluish (or gray) lips or face now   Negative: [1] Difficulty breathing AND [2] exposure to flames, smoke, or fumes   Negative: [1] Stridor AND [2] difficulty breathing   Negative: Sounds like a life-threatening emergency to the triager   Negative: [1] Previous asthma attacks AND [2] this feels like asthma attack   Negative: Dry cough (non-productive;  no sputum or minimal clear sputum)   Negative: [1] MODERATE difficulty breathing (e.g., speaks in phrases, SOB even at rest, pulse 100-120) AND [2] still present when not coughing   Negative: Chest pain  (Exception: MILD central chest pain, present only when coughing.)   Negative: Patient sounds very sick or  weak to the triager    Protocols used: Cough - Acute Ilviscinrb-U-XE

## 2024-01-01 NOTE — PROGRESS NOTES
Patient presents with:  Cough: X 1 week and a day. Nasal congestion, cough up green phelgm and noticed little blood. White spots at one side tonsil per pt, throbbing headaches, no fever, chills, no SOB/chest pain.      Clinical Decision Making:  Strep test was obtained and was negative.  Culture is to follow.  COVID-19 screening test was negative at home.  Patient is treated for sinusitis with Augmentin.  Symptomatic care was gone over. Expected course of resolution and indication for return was gone over and questions were answered to patient/parent's satisfaction before discharge.        ICD-10-CM    1. Acute non-recurrent maxillary sinusitis  J01.00 amoxicillin-clavulanate (AUGMENTIN) 875-125 MG tablet     fluticasone (FLONASE) 50 MCG/ACT nasal spray      2. Throat pain  R07.0 Streptococcus A Rapid Screen w/Reflex to PCR - Clinic Collect     Group A Streptococcus PCR Throat Swab          Patient Instructions   Over-the-counter nasal steroid spray, follow packaging directions  Over-the-counter Mucinex, follow packaging directions  Hot packs 3 times per day to the forehead and face over the tender sinuses  Nasal saline irrigation or Sugar Arellano for congestion  Antibiotic as written below.  Risks and benefits of the medication were gone over.  Indication for return to see urgent care or family practice provider for reevaluation and treatment.        HPI:  Siria Ward is a 38 year old female who presents today for an 8-day history of headache, sore throat low-grade subjective fever cough that is productive of off-color green phlegm and fatigue.  Has not had myalgias arthralgias sore throat sinus pain or pressure headache.  Has had wheezing associated with the cough.  At home COVID test was performed yesterday was negative.  No treatments tried for this at home.    History obtained from chart review and the patient.    Problem List:  2014-08: Normal delivery  2014-08: Normal labor and delivery  Obesity      Past  Medical History:   Diagnosis Date    Migraine     Vertigo        Social History     Tobacco Use    Smoking status: Never     Passive exposure: Never    Smokeless tobacco: Never   Substance Use Topics    Alcohol use: No       Review of Systems  As above in HPI otherwise negative.    Vitals:    01/01/24 1117   BP: 116/78   BP Location: Right arm   Patient Position: Sitting   Cuff Size: Adult Large   Pulse: 75   Resp: 18   Temp: 98.9  F (37.2  C)   TempSrc: Oral   SpO2: 96%   Weight: 110 kg (242 lb 6.4 oz)       General: Patient is resting comfortably no acute distress is afebrile  HEENT: Head is normocephalic atraumatic   Frontal maxillary sinuses are tender to percussion  eyes are PERRL EOMI sclera anicteric   TMs are clear bilaterally  Throat is with mild pharyngeal wall drainage no exudate  No cervical lymphadenopathy present  LUNGS: Clear to auscultation bilaterally  HEART: Regular rate and rhythm  Skin: Without rash non-diaphoretic    Physical Exam      Labs:  Results for orders placed or performed in visit on 01/01/24   Streptococcus A Rapid Screen w/Reflex to PCR - Clinic Collect     Status: Normal    Specimen: Throat; Swab   Result Value Ref Range    Group A Strep antigen Negative Negative       At the end of the encounter, I discussed results, diagnosis, medications. Discussed red flags for immediate return to clinic/ER, as well as indications for follow up if no improvement. Patient understood and agreed to plan. Patient was stable for discharge.

## 2024-07-21 ENCOUNTER — HEALTH MAINTENANCE LETTER (OUTPATIENT)
Age: 39
End: 2024-07-21

## 2025-08-10 ENCOUNTER — HEALTH MAINTENANCE LETTER (OUTPATIENT)
Age: 40
End: 2025-08-10